# Patient Record
Sex: FEMALE | Race: WHITE | NOT HISPANIC OR LATINO | Employment: OTHER | URBAN - METROPOLITAN AREA
[De-identification: names, ages, dates, MRNs, and addresses within clinical notes are randomized per-mention and may not be internally consistent; named-entity substitution may affect disease eponyms.]

---

## 2017-08-10 ENCOUNTER — ANESTHESIA EVENT (OUTPATIENT)
Dept: PERIOP | Facility: HOSPITAL | Age: 77
End: 2017-08-10
Payer: MEDICARE

## 2017-08-11 ENCOUNTER — ANESTHESIA (OUTPATIENT)
Dept: PERIOP | Facility: HOSPITAL | Age: 77
End: 2017-08-11
Payer: MEDICARE

## 2017-08-11 ENCOUNTER — HOSPITAL ENCOUNTER (OUTPATIENT)
Facility: HOSPITAL | Age: 77
Setting detail: OUTPATIENT SURGERY
Discharge: HOME | End: 2017-08-11
Attending: COLON & RECTAL SURGERY | Admitting: COLON & RECTAL SURGERY
Payer: MEDICARE

## 2017-08-11 ENCOUNTER — GENERIC CONVERSION - ENCOUNTER (OUTPATIENT)
Dept: OTHER | Facility: OTHER | Age: 77
End: 2017-08-11

## 2017-08-11 VITALS
RESPIRATION RATE: 20 BRPM | OXYGEN SATURATION: 100 % | TEMPERATURE: 97.9 F | DIASTOLIC BLOOD PRESSURE: 60 MMHG | WEIGHT: 108.4 LBS | HEART RATE: 94 BPM | SYSTOLIC BLOOD PRESSURE: 130 MMHG | HEIGHT: 63 IN | BODY MASS INDEX: 19.21 KG/M2

## 2017-08-11 RX ORDER — SODIUM CHLORIDE, SODIUM LACTATE, POTASSIUM CHLORIDE, CALCIUM CHLORIDE 600; 310; 30; 20 MG/100ML; MG/100ML; MG/100ML; MG/100ML
125 INJECTION, SOLUTION INTRAVENOUS CONTINUOUS
Status: DISCONTINUED | OUTPATIENT
Start: 2017-08-11 | End: 2017-08-11 | Stop reason: HOSPADM

## 2017-08-11 RX ORDER — DESLORATADINE 5 MG/1
5 TABLET ORAL DAILY
COMMUNITY
End: 2018-01-17

## 2017-08-11 RX ORDER — LISINOPRIL 20 MG/1
20 TABLET ORAL DAILY
COMMUNITY

## 2017-08-11 RX ORDER — PROPOFOL 10 MG/ML
INJECTION, EMULSION INTRAVENOUS AS NEEDED
Status: DISCONTINUED | OUTPATIENT
Start: 2017-08-11 | End: 2017-08-11 | Stop reason: SURG

## 2017-08-11 RX ADMIN — PROPOFOL 100 MG: 10 INJECTION, EMULSION INTRAVENOUS at 07:23

## 2017-08-11 RX ADMIN — PROPOFOL 20 MG: 10 INJECTION, EMULSION INTRAVENOUS at 07:26

## 2017-08-11 RX ADMIN — PROPOFOL 20 MG: 10 INJECTION, EMULSION INTRAVENOUS at 07:29

## 2017-08-11 RX ADMIN — PROPOFOL 20 MG: 10 INJECTION, EMULSION INTRAVENOUS at 07:25

## 2017-08-11 RX ADMIN — PROPOFOL 20 MG: 10 INJECTION, EMULSION INTRAVENOUS at 07:32

## 2017-08-11 RX ADMIN — PROPOFOL 20 MG: 10 INJECTION, EMULSION INTRAVENOUS at 07:31

## 2017-08-11 RX ADMIN — SODIUM CHLORIDE, SODIUM LACTATE, POTASSIUM CHLORIDE, AND CALCIUM CHLORIDE: .6; .31; .03; .02 INJECTION, SOLUTION INTRAVENOUS at 07:19

## 2018-01-16 ENCOUNTER — ALLSCRIPTS OFFICE VISIT (OUTPATIENT)
Dept: OTHER | Facility: OTHER | Age: 78
End: 2018-01-16

## 2018-01-22 ENCOUNTER — ANESTHESIA EVENT (OUTPATIENT)
Dept: PERIOP | Facility: HOSPITAL | Age: 78
End: 2018-01-22
Payer: MEDICARE

## 2018-01-22 VITALS
BODY MASS INDEX: 20.27 KG/M2 | HEART RATE: 96 BPM | WEIGHT: 103.25 LBS | HEIGHT: 60 IN | DIASTOLIC BLOOD PRESSURE: 90 MMHG | SYSTOLIC BLOOD PRESSURE: 128 MMHG

## 2018-01-23 ENCOUNTER — HOSPITAL ENCOUNTER (OUTPATIENT)
Facility: HOSPITAL | Age: 78
Setting detail: OUTPATIENT SURGERY
Discharge: HOME | End: 2018-01-23
Attending: INTERNAL MEDICINE | Admitting: INTERNAL MEDICINE
Payer: MEDICARE

## 2018-01-23 ENCOUNTER — ANESTHESIA (OUTPATIENT)
Dept: PERIOP | Facility: HOSPITAL | Age: 78
End: 2018-01-23
Payer: MEDICARE

## 2018-01-23 VITALS
SYSTOLIC BLOOD PRESSURE: 185 MMHG | WEIGHT: 101.1 LBS | RESPIRATION RATE: 19 BRPM | BODY MASS INDEX: 17.91 KG/M2 | OXYGEN SATURATION: 99 % | HEART RATE: 94 BPM | HEIGHT: 63 IN | DIASTOLIC BLOOD PRESSURE: 85 MMHG | TEMPERATURE: 97.4 F

## 2018-01-23 RX ORDER — PROPOFOL 10 MG/ML
INJECTION, EMULSION INTRAVENOUS AS NEEDED
Status: DISCONTINUED | OUTPATIENT
Start: 2018-01-23 | End: 2018-01-23 | Stop reason: SURG

## 2018-01-23 RX ORDER — SODIUM CHLORIDE, SODIUM LACTATE, POTASSIUM CHLORIDE, CALCIUM CHLORIDE 600; 310; 30; 20 MG/100ML; MG/100ML; MG/100ML; MG/100ML
125 INJECTION, SOLUTION INTRAVENOUS CONTINUOUS
Status: DISCONTINUED | OUTPATIENT
Start: 2018-01-23 | End: 2018-01-23 | Stop reason: HOSPADM

## 2018-01-23 RX ORDER — LIDOCAINE HYDROCHLORIDE 10 MG/ML
INJECTION, SOLUTION INFILTRATION; PERINEURAL AS NEEDED
Status: DISCONTINUED | OUTPATIENT
Start: 2018-01-23 | End: 2018-01-23 | Stop reason: SURG

## 2018-01-23 RX ORDER — ATORVASTATIN CALCIUM 20 MG/1
20 TABLET, FILM COATED ORAL
COMMUNITY
Start: 2017-03-06

## 2018-01-23 RX ADMIN — SODIUM CHLORIDE, SODIUM LACTATE, POTASSIUM CHLORIDE, AND CALCIUM CHLORIDE 125 ML/HR: .6; .31; .03; .02 INJECTION, SOLUTION INTRAVENOUS at 06:52

## 2018-01-23 RX ADMIN — PROPOFOL 90 MG: 10 INJECTION, EMULSION INTRAVENOUS at 07:24

## 2018-01-23 RX ADMIN — LIDOCAINE HYDROCHLORIDE 50 MG: 10 INJECTION, SOLUTION INFILTRATION; PERINEURAL at 07:24

## 2018-01-23 NOTE — ANESTHESIA POSTPROCEDURE EVALUATION
Post-Op Assessment Note      CV Status:  Stable    Mental Status:  Lethargic    Hydration Status:  Stable and euvolemic    PONV Controlled:  None    Airway Patency:  Patent    Post Op Vitals Reviewed: Yes          Staff: CRNA           BP   154/75   Temp      Pulse 118   Resp   20   SpO2   100

## 2018-01-23 NOTE — DISCHARGE INSTRUCTIONS
Upper Endoscopy   WHAT YOU NEED TO KNOW:   An upper endoscopy is also called an upper gastrointestinal (GI) endoscopy, or an esophagogastroduodenoscopy (EGD)  You may feel bloated, gassy, or have some abdominal discomfort after your procedure  Your throat may be sore for 24 to 36 hours  You may burp or pass gas from air that is still inside your body  DISCHARGE INSTRUCTIONS:   Call 911 if:   · You have sudden chest pain or trouble breathing  Seek care immediately if:   · You feel dizzy or faint  · You have trouble swallowing  · You have severe throat pain  · Your bowel movements are very dark or black  · Your abdomen is hard and firm and you have severe pain  · You vomit blood  Contact your healthcare provider if:   · You feel full or bloated and cannot burp or pass gas  · You have not had a bowel movement for 3 days after your procedure  · You have neck pain  · You have a fever or chills  · You have nausea or are vomiting  · You have a rash or hives  · You have questions or concerns about your endoscopy  Relieve a sore throat:  Suck on throat lozenges or crushed ice  Gargle with a small amount of warm salt water  Mix 1 teaspoon of salt and 1 cup of warm water to make salt water  Relieve gas and discomfort from bloating:  Lie on your right side with a heating pad on your abdomen  Take short walks to help pass gas  Eat small meals until bloating is relieved  Rest after your procedure:  Do not drive or make important decisions until the day after your procedure  Return to your normal activity as directed  You can usually return to work the day after your procedure  Follow up with your healthcare provider as directed:  Write down your questions so you remember to ask them during your visits  © 2017 [employer]  Information is for End User's use only and may not be sold, redistributed or otherwise used for commercial purposes   All illustrations and images included in Leah are the copyrighted property of [employer]  or Donald Alvarez  The above information is an  only  It is not intended as medical advice for individual conditions or treatments  Talk to your doctor, nurse or pharmacist before following any medical regimen to see if it is safe and effective for you

## 2018-01-23 NOTE — ANESTHESIA PREPROCEDURE EVALUATION
Review of Systems/Medical History  Patient summary reviewed  Chart reviewed  No history of anesthetic complications     Cardiovascular  Exercise tolerance: good,  Hyperlipidemia, Hypertension ,    Pulmonary  Not a smoker , Recent URI (significantly improved, residual nasal congestion) ,        GI/Hepatic      Comment: Confirmed NPO appropriate  Recent unintentional weight loss and +FIT     Negative  ROS        Endo/Other  Negative endo/other ROS      GYN       Hematology  Negative hematology ROS      Musculoskeletal    Arthritis     Neurology  Negative neurology ROS      Psychology   Negative psychology ROS              Physical Exam    Airway    Mallampati score: II  TM Distance: <3 FB  Neck ROM: full     Dental   No notable dental hx     Cardiovascular  Rhythm: regular, Rate: abnormal,     Pulmonary  Pulmonary exam normal     Other Findings        Anesthesia Plan  ASA Score- 2     Anesthesia Type- IV sedation with anesthesia with ASA Monitors  Additional Monitors:   Airway Plan:     Comment: I discussed the risks and benefits of IV sedation anesthesia including the possibility of the need to convert to general anesthesia and the potential risk of awareness  The patient was given the opportunity to ask questions, which were answered        Plan Factors-    Induction- intravenous  Postoperative Plan-     Informed Consent- Anesthetic plan and risks discussed with patient

## 2018-01-23 NOTE — OP NOTE
**** GI/ENDOSCOPY REPORT ****     PATIENT NAME: Felipe Perry - VISIT ID:  Patient ID: [id number]   YOB: 1940     INTRODUCTION: Esophagogastroduodenoscopy - A 78 female patient presents   for an outpatient Esophagogastroduodenoscopy at [facility] [place]  INDICATIONS: Fecal occult blood positive  CONSENT: The benefits, risks, and alternatives to the procedure were   discussed and informed consent was obtained from the patient  PREPARATION:  EKG, pulse, pulse oximetry and blood pressure were monitored   throughout the procedure  MEDICATIONS:asa 2     PROCEDURE:  The endoscope was passed without difficulty through the mouth   under direct visualization and advanced to the 3rd portion of the   duodenum  The scope was withdrawn and the mucosa was carefully examined  FINDINGS:  The EGD examination was completely normal   Esophagus: The   esophagus appeared to be normal  The Z line was visualized at 36 cm from   the entry site  Stomach: The antrum, body of the stomach, cardia, fundus,   incisura, and pylorus appeared to be normal   Duodenum: The duodenal bulb,   2nd portion of the duodenum, and 3rd portion of the duodenum appeared to   be normal      COMPLICATIONS: There were no complications  IMPRESSIONS: Normal EGD  Normal esophagus  Z line visualized  Normal   antrum, body of the stomach, cardia, fundus, incisura, and pylorus  Normal   duodenal bulb, 2nd portion of the duodenum, and 3rd portion of the   duodenum  RECOMMENDATIONS: Pill cam - esophagus and small bowel recommended  ESTIMATED BLOOD LOSS: None  PATHOLOGY SPECIMENS: No     PROCEDURE CODES: 49451 - EGD flexible; incl brushing or washing     ICD-9 Codes: 792 1 Nonspecific abnormal findings in stool contents     ICD-10 Codes: R19 5 Other fecal abnormalities     PERFORMED BY: IVORY Brownlee  on 01/23/2018    Version 1, electronically signed by IVORY Friedman , D O  on   01/23/2018 at 07:31

## 2018-01-23 NOTE — CONSULTS
Chief Complaint  Positive fit  Unintentional weight loss      History of Present Illness  81-year-old female with recent passing of her mother in August  She is here for unintentional weight loss during that time and a stool test that showed positive blood  She reports she lost her appetite while caring for her dying mother and lymphoma 110 pounds down to 100 pounds  Today she weighs 103 pounds and states her appetite has improved  She reports no nausea, vomiting, epigastric pain and states she is regaining her weight  She has no diarrhea or change in stool at all and no constipation  She had a recent blood work by her PCP but is unsure whether this and states she had a colonoscopy by Dr Teresa Cisneros either late last year or early this year  She states it was completely normal  Her appetite has returned and she reports reports no fatigue, palpitations or shortness of breath  Plan: we will schedule her for an endoscopy and get the colonoscopy report from Dr Teresa Cisneros  We will ask her PCP to sinus over the results of her recent lab work including her stool tests  The patient is unsure if her stool test was positive or negative but does report she had one or 2 black bowel movements in August  She has waited come here for she didn't feel like she needed to  She does not smoke  She is not on any blood thinners  She does not take any herbal supplements  She states she is only here because she emailed her PCP and asked if she still needed to follow-up because she feels so good and was advised to do so  The history was obtained today from the patient and I agree with the documented history  Review of Systems    Constitutional: as noted in HPI    ENT: no sore throat and no hoarseness  Cardiovascular: no chest pain and no palpitations  Respiratory: no shortness of breath  Gastrointestinal: as noted in HPI  Genitourinary: no incontinence  Musculoskeletal: no arthralgias  Integumentary: no rashes  Neurological: no headache  Psychiatric: no anxiety  Endocrine: no muscle weakness  Hematologic/Lymphatic: no tendency for easy bleeding  Active Problems    1  Hyperlipidemia, unspecified hyperlipidemia type (272 4) (E78 5)   2  Hypertension, unspecified type (401 9) (I10)    Past Medical History    · No pertinent past medical history    The active problems and past medical history were reviewed and updated today  Surgical History    · Denied: History Of Prior Surgery    The surgical history was reviewed and updated today  Family History    · Family history of Diagnosis unknown    · Family history of Diagnosis unknown    The family history was reviewed and updated today  Social History    · Never a smoker  The social history was reviewed and updated today  Current Meds   1  Lisinopril 2 5 MG Oral Tablet; Take 1 tablet daily Recorded    The medication list was reviewed and updated today  Allergies    1  No Known Drug Allergies    Vitals   Recorded: [id number] 09:29AM   Heart Rate 96   Systolic 381   Diastolic 90   Height 5 ft    Weight 103 lb 4 00 oz   BMI Calculated 20 16   BSA Calculated 1 4     Physical Exam    Constitutional   General appearance: No acute distress, well appearing and well nourished  Eyes anicteric  Pupils and irises: Equal, round and reactive to light  Ears, Nose, Mouth, and Throat   External inspection of ears and nose: Normal     Nasal mucosa, septum, and turbinates: Normal without edema or erythema  Oropharynx: Normal with no erythema, edema, exudate or lesions  Pulmonary   Respiratory effort: No increased work of breathing or signs of respiratory distress  Auscultation of lungs: Clear to auscultation  Cardiovascular   Auscultation of heart: Normal rate and rhythm, normal S1 and S2, without murmurs      Examination of extremities for edema and/or varicosities: Normal     Carotid pulses: Normal     Abdomen   Abdomen: Non-tender, no masses  Liver and spleen: No hepatomegaly or splenomegaly  Lymphatic   Palpation of lymph nodes in neck: No lymphadenopathy  Musculoskeletal   Digits and nails: Normal without clubbing or cyanosis  Inspection/palpation of joints, bones, and muscles: Normal     Skin   Skin and subcutaneous tissue: Normal without rashes or lesions  Neurologic no nystyagmus or asterixis  Reflexes: 2+ and symmetric  Sensation: No sensory loss  Psychiatric   Orientation to person, place, and time: Normal     Mood and affect: Normal          Assessment    1  Positive FIT (fecal immunochemical test) (792 1) (R19 5)   2  Unintentional weight loss (783 21) (R63 4)    Plan  Positive FIT (fecal immunochemical test), Unintentional weight loss    · EGD; Status:Hold For - Scheduling; Requested FYC:[id number]; Perform:[facility]; PTP:[id number]; Ordered; For:Positive FIT (fecal immunochemical test), Unintentional weight loss; Ordered By:Reji Mead Gave;   · ENDOSCOPY - PROCEDURE, RISKS, AND BENEFITS; Status:Complete;   Done:  [id number]   Ordered; For:Positive FIT (fecal immunochemical test), Unintentional weight loss; Ordered By:Reji Mead Gave;    Discussion/Summary    We will obtain recent colonoscopy report from Dr Tita Vargas  We will obtain recent lab results from your PCP  Patient is scheduled for an endoscopy        Future Appointments    Signatures   Electronically signed by : Kalee Espinal ; Jan 16 2018 10:19AM EST                       (Author)    Electronically signed by : IVORY Morales ; Jan 16 2018 10:21AM EST                       (Author)

## 2018-02-06 ENCOUNTER — CLINICAL SUPPORT (OUTPATIENT)
Dept: GASTROENTEROLOGY | Facility: CLINIC | Age: 78
End: 2018-02-06
Payer: MEDICARE

## 2018-02-06 DIAGNOSIS — R19.5 ABNORMAL FECES: ICD-10-CM

## 2018-02-06 PROCEDURE — 91110 GI TRC IMG INTRAL ESOPH-ILE: CPT | Performed by: INTERNAL MEDICINE

## 2018-02-08 ENCOUNTER — TELEPHONE (OUTPATIENT)
Dept: GASTROENTEROLOGY | Facility: CLINIC | Age: 78
End: 2018-02-08

## 2018-04-10 LAB — OCCULT BLD, FECAL IMMUNOLOGICAL (HISTORICAL): POSITIVE

## 2023-12-15 RX ORDER — TRIAMCINOLONE ACETONIDE 1 MG/G
CREAM TOPICAL
Qty: 80 G | Refills: 0 | OUTPATIENT
Start: 2023-12-15